# Patient Record
Sex: FEMALE | NOT HISPANIC OR LATINO | Employment: FULL TIME | URBAN - METROPOLITAN AREA
[De-identification: names, ages, dates, MRNs, and addresses within clinical notes are randomized per-mention and may not be internally consistent; named-entity substitution may affect disease eponyms.]

---

## 2023-04-21 LAB
ALANINE AMINOTRANSFERASE (SGPT) (U/L) IN SER/PLAS: 29 U/L (ref 7–45)
ALBUMIN (G/DL) IN SER/PLAS: 4.2 G/DL (ref 3.4–5)
ALBUMIN (MG/L) IN URINE: <7 MG/L
ALBUMIN/CREATININE (UG/MG) IN URINE: ABNORMAL UG/MG CRT (ref 0–30)
ALKALINE PHOSPHATASE (U/L) IN SER/PLAS: 125 U/L (ref 33–110)
ANION GAP IN SER/PLAS: 12 MMOL/L (ref 10–20)
ASPARTATE AMINOTRANSFERASE (SGOT) (U/L) IN SER/PLAS: 16 U/L (ref 9–39)
BILIRUBIN TOTAL (MG/DL) IN SER/PLAS: 0.6 MG/DL (ref 0–1.2)
CALCIUM (MG/DL) IN SER/PLAS: 9.1 MG/DL (ref 8.6–10.3)
CARBON DIOXIDE, TOTAL (MMOL/L) IN SER/PLAS: 23 MMOL/L (ref 21–32)
CHLORIDE (MMOL/L) IN SER/PLAS: 104 MMOL/L (ref 98–107)
CHOLESTEROL (MG/DL) IN SER/PLAS: 183 MG/DL (ref 0–199)
CHOLESTEROL IN HDL (MG/DL) IN SER/PLAS: 45.4 MG/DL
CHOLESTEROL/HDL RATIO: 4
CREATININE (MG/DL) IN SER/PLAS: 0.51 MG/DL (ref 0.5–1.05)
CREATININE (MG/DL) IN URINE: 8.8 MG/DL (ref 20–320)
ERYTHROCYTE DISTRIBUTION WIDTH (RATIO) BY AUTOMATED COUNT: 14.6 % (ref 11.5–14.5)
ERYTHROCYTE MEAN CORPUSCULAR HEMOGLOBIN CONCENTRATION (G/DL) BY AUTOMATED: 31.1 G/DL (ref 32–36)
ERYTHROCYTE MEAN CORPUSCULAR VOLUME (FL) BY AUTOMATED COUNT: 81 FL (ref 80–100)
ERYTHROCYTES (10*6/UL) IN BLOOD BY AUTOMATED COUNT: 4.78 X10E12/L (ref 4–5.2)
GFR FEMALE: >90 ML/MIN/1.73M2
GLUCOSE (MG/DL) IN SER/PLAS: 180 MG/DL (ref 74–99)
HEMATOCRIT (%) IN BLOOD BY AUTOMATED COUNT: 38.9 % (ref 36–46)
HEMOGLOBIN (G/DL) IN BLOOD: 12.1 G/DL (ref 12–16)
LDL: 112 MG/DL (ref 0–119)
LEUKOCYTES (10*3/UL) IN BLOOD BY AUTOMATED COUNT: 8.7 X10E9/L (ref 4.4–11.3)
NON HDL CHOLESTEROL: 138 MG/DL (ref 0–149)
PLATELETS (10*3/UL) IN BLOOD AUTOMATED COUNT: 405 X10E9/L (ref 150–450)
POTASSIUM (MMOL/L) IN SER/PLAS: 4.3 MMOL/L (ref 3.5–5.3)
PROTEIN TOTAL: 6.9 G/DL (ref 6.4–8.2)
SODIUM (MMOL/L) IN SER/PLAS: 135 MMOL/L (ref 136–145)
THYROTROPIN (MIU/L) IN SER/PLAS BY DETECTION LIMIT <= 0.05 MIU/L: 0.83 MIU/L (ref 0.44–3.98)
TRIGLYCERIDE (MG/DL) IN SER/PLAS: 128 MG/DL (ref 0–149)
UREA NITROGEN (MG/DL) IN SER/PLAS: 10 MG/DL (ref 6–23)
VLDL: 26 MG/DL (ref 0–40)

## 2024-02-20 ENCOUNTER — APPOINTMENT (OUTPATIENT)
Dept: ENDOCRINOLOGY | Facility: CLINIC | Age: 24
End: 2024-02-20
Payer: MEDICARE

## 2024-03-11 DIAGNOSIS — E10.9 TYPE 1 DIABETES MELLITUS WITHOUT COMPLICATION (MULTI): Primary | ICD-10-CM

## 2024-03-11 RX ORDER — BLOOD-GLUCOSE METER
EACH MISCELLANEOUS
Qty: 600 STRIP | Refills: 0 | Status: SHIPPED | OUTPATIENT
Start: 2024-03-11

## 2024-03-27 DIAGNOSIS — E10.9 TYPE 1 DIABETES MELLITUS WITHOUT COMPLICATION (MULTI): ICD-10-CM

## 2024-06-12 ENCOUNTER — APPOINTMENT (OUTPATIENT)
Dept: ENDOCRINOLOGY | Facility: CLINIC | Age: 24
End: 2024-06-12
Payer: MEDICARE

## 2024-06-12 VITALS
WEIGHT: 163 LBS | SYSTOLIC BLOOD PRESSURE: 115 MMHG | BODY MASS INDEX: 32.95 KG/M2 | HEART RATE: 89 BPM | DIASTOLIC BLOOD PRESSURE: 74 MMHG

## 2024-06-12 DIAGNOSIS — E10.9 TYPE 1 DIABETES MELLITUS WITHOUT COMPLICATION (MULTI): Primary | ICD-10-CM

## 2024-06-12 DIAGNOSIS — Z96.41 INSULIN PUMP STATUS: ICD-10-CM

## 2024-06-12 LAB — POC HEMOGLOBIN A1C: 7.8 % (ref 4.2–6.5)

## 2024-06-12 PROCEDURE — 99214 OFFICE O/P EST MOD 30 MIN: CPT | Performed by: INTERNAL MEDICINE

## 2024-06-12 PROCEDURE — 3078F DIAST BP <80 MM HG: CPT | Performed by: INTERNAL MEDICINE

## 2024-06-12 PROCEDURE — 3074F SYST BP LT 130 MM HG: CPT | Performed by: INTERNAL MEDICINE

## 2024-06-12 PROCEDURE — 83036 HEMOGLOBIN GLYCOSYLATED A1C: CPT | Performed by: INTERNAL MEDICINE

## 2024-06-12 RX ORDER — INSULIN LISPRO 100 [IU]/ML
INJECTION, SOLUTION INTRAVENOUS; SUBCUTANEOUS
COMMUNITY
Start: 2022-01-11

## 2024-06-12 RX ORDER — ALBUTEROL SULFATE 90 UG/1
AEROSOL, METERED RESPIRATORY (INHALATION)
COMMUNITY
Start: 2022-12-07

## 2024-06-12 RX ORDER — FLUTICASONE PROPIONATE 50 MCG
SPRAY, SUSPENSION (ML) NASAL
COMMUNITY
Start: 2022-02-09

## 2024-06-12 RX ORDER — URINE ACETONE TEST STRIPS
STRIP MISCELLANEOUS
COMMUNITY
Start: 2014-10-09

## 2024-06-12 RX ORDER — BLOOD-GLUCOSE METER
EACH MISCELLANEOUS
Qty: 800 STRIP | Refills: 3 | Status: SHIPPED | OUTPATIENT
Start: 2024-06-12

## 2024-06-12 RX ORDER — INSULIN GLARGINE 100 [IU]/ML
INJECTION, SOLUTION SUBCUTANEOUS
COMMUNITY
Start: 2019-01-02

## 2024-06-12 ASSESSMENT — ENCOUNTER SYMPTOMS
FREQUENCY: 0
COUGH: 0
APPETITE CHANGE: 0
HEADACHES: 0
NERVOUS/ANXIOUS: 0
CONSTIPATION: 0
VOMITING: 0
ARTHRALGIAS: 0
SORE THROAT: 0
ABDOMINAL PAIN: 0
SHORTNESS OF BREATH: 0
FEVER: 0
NAUSEA: 0
DIARRHEA: 0
POLYDIPSIA: 0

## 2024-06-12 NOTE — PROGRESS NOTES
History Of Present Illness  Hayley Clemons is a 23 y.o. female     Duration of type 1 diabetes mellitus:  12 years  Complications:  none    Patient last seen 14 months ago  Interval change from Medtronic to Tandem t:Slim    She decided she does not like DexCom CGM and stopped wearing it.  Adhesion is poor.     Insulin pump status  Insulin:  Humalog    Manual Basal  Basal total 24.2 units  MN-06 1  06-11 1.1 unit/h  11-17 0.9 unit/h  17-21 1.0 unit/h  21-MN 1.1 unit/h    Bolus  MN 1 unit per 12 grams carb    Insulin sensitivity factor:  50  Target glucose 110  Active insulin time 5h    Patient is testing glucose 7-8 times daily  Records reviewed, on file    Last eye exam:  2022    Past Medical History  She has a past medical history of Other specified health status and Other specified health status.    Surgical History  She has no past surgical history on file.     Social History  She reports that she has been smoking cigarettes. She has never used smokeless tobacco. No history on file for alcohol use and drug use.    Family History  No family history on file.    Medications  Current Outpatient Medications   Medication Instructions    acetone, urine, test (Ketostix) strip USE AS DIRECTED IF BLOOD GLUCOSE IS GREATER THAN 250 OR IF ILL    albuterol (ProAir HFA) 90 mcg/actuation inhaler 2 puff(s), Inhale, q6 hrs, # 8.5 g, 0 Refill(s), Type: Maintenance, Pharmacy: [id number], 2 puff(s) Inhale q6 hrs, 149, lb, 12/07/22 13:50:00 EST, Weight Measured    blood sugar diagnostic (OneTouch Verio test strips) strip test 6 times a day    fluticasone (Flonase Allergy Relief) 50 mcg/actuation nasal spray nasal    glucagon (Glucagen) 1 mg injection inject 1 mg as needed for severe hypoglycemia    insulin glargine (Lantus Solostar U-100 Insulin) 100 unit/mL (3 mL) pen subcutaneous    insulin lispro (HumaLOG) 100 unit/mL injection subcutaneous       Allergies  Bee pollen    Review of Systems   Constitutional:  Negative for  appetite change and fever.   HENT:  Negative for sore throat.         Denies dry mouth   Eyes:  Negative for visual disturbance.   Respiratory:  Negative for cough and shortness of breath.    Cardiovascular:  Negative for chest pain.   Gastrointestinal:  Negative for abdominal pain, constipation, diarrhea, nausea and vomiting.   Endocrine: Negative for polydipsia and polyuria.   Genitourinary:  Negative for frequency.   Musculoskeletal:  Negative for arthralgias.   Skin:  Negative for rash.   Neurological:  Negative for headaches.   Psychiatric/Behavioral:  The patient is not nervous/anxious.          Last Recorded Vitals  Blood pressure 115/74, pulse 89, weight 73.9 kg (163 lb).    Physical Exam  Constitutional:       General: She is not in acute distress.  HENT:      Head: Normocephalic.      Mouth/Throat:      Mouth: Mucous membranes are moist.   Eyes:      Extraocular Movements: Extraocular movements intact.   Neck:      Thyroid: No thyroid mass or thyromegaly.   Cardiovascular:      Pulses:           Radial pulses are 2+ on the right side and 2+ on the left side.   Musculoskeletal:      Right lower leg: No edema.      Left lower leg: No edema.      Right foot: No deformity.      Left foot: No deformity.   Lymphadenopathy:      Cervical: No cervical adenopathy.   Skin:     Comments: No foot sores   Neurological:      Mental Status: She is alert.      Motor: No tremor.   Psychiatric:         Mood and Affect: Affect normal.              IMPRESSION  TYPE 1 DIABETES MELLITUS  INSULIN PUMP STATUS  Rapid A1c 7.8%  Reasonable A1c   No glucose data provided  She has had trouble using CGM, DexCom or Rea  Unable to use automated mode (Control-IQ)  Problems with cost of Tandem supplies at [facility].      RECOMMENDATIONS  Continue current program    Tandem Pump supplies from appropriate supplier.    Follow up 3 months  Draw labs, urine albumin before next appointment

## 2024-06-12 NOTE — PATIENT INSTRUCTIONS
A1c 7.8%      RECOMMENDATIONS  Continue current program    Tandem Pump supplies from appropriate supplier.    Follow up 3 months  Draw labs, urine albumin before next appointment

## 2024-07-16 DIAGNOSIS — E10.9 TYPE 1 DIABETES MELLITUS WITHOUT COMPLICATION (MULTI): Primary | ICD-10-CM

## 2024-07-17 RX ORDER — INSULIN LISPRO 100 [IU]/ML
INJECTION, SOLUTION INTRAVENOUS; SUBCUTANEOUS
Qty: 90 ML | Refills: 3 | Status: SHIPPED | OUTPATIENT
Start: 2024-07-17

## 2024-09-18 ENCOUNTER — APPOINTMENT (OUTPATIENT)
Dept: ENDOCRINOLOGY | Facility: CLINIC | Age: 24
End: 2024-09-18
Payer: MEDICARE

## 2024-09-18 ENCOUNTER — LAB (OUTPATIENT)
Dept: LAB | Facility: LAB | Age: 24
End: 2024-09-18
Payer: MEDICARE

## 2024-09-18 VITALS
HEART RATE: 66 BPM | BODY MASS INDEX: 32.34 KG/M2 | SYSTOLIC BLOOD PRESSURE: 131 MMHG | DIASTOLIC BLOOD PRESSURE: 87 MMHG | WEIGHT: 160 LBS

## 2024-09-18 DIAGNOSIS — E10.9 TYPE 1 DIABETES MELLITUS WITHOUT COMPLICATION: ICD-10-CM

## 2024-09-18 DIAGNOSIS — Z96.41 INSULIN PUMP STATUS: ICD-10-CM

## 2024-09-18 DIAGNOSIS — E10.9 TYPE 1 DIABETES MELLITUS WITHOUT COMPLICATION: Primary | ICD-10-CM

## 2024-09-18 LAB
ALBUMIN SERPL BCP-MCNC: 4.2 G/DL (ref 3.4–5)
ALP SERPL-CCNC: 133 U/L (ref 33–110)
ALT SERPL W P-5'-P-CCNC: 12 U/L (ref 7–45)
ANION GAP SERPL CALC-SCNC: 12 MMOL/L (ref 10–20)
AST SERPL W P-5'-P-CCNC: 11 U/L (ref 9–39)
BILIRUB SERPL-MCNC: 0.5 MG/DL (ref 0–1.2)
BUN SERPL-MCNC: 7 MG/DL (ref 6–23)
CALCIUM SERPL-MCNC: 9.3 MG/DL (ref 8.6–10.3)
CHLORIDE SERPL-SCNC: 99 MMOL/L (ref 98–107)
CHOLEST SERPL-MCNC: 156 MG/DL (ref 0–199)
CHOLESTEROL/HDL RATIO: 3.7
CO2 SERPL-SCNC: 28 MMOL/L (ref 21–32)
CREAT SERPL-MCNC: 0.63 MG/DL (ref 0.5–1.05)
CREAT UR-MCNC: 96.8 MG/DL (ref 20–320)
EGFRCR SERPLBLD CKD-EPI 2021: >90 ML/MIN/1.73M*2
ERYTHROCYTE [DISTWIDTH] IN BLOOD BY AUTOMATED COUNT: 17.4 % (ref 11.5–14.5)
EST. AVERAGE GLUCOSE BLD GHB EST-MCNC: 192 MG/DL
GLUCOSE SERPL-MCNC: 181 MG/DL (ref 74–99)
HBA1C MFR BLD: 8.3 %
HCT VFR BLD AUTO: 39.9 % (ref 36–46)
HDLC SERPL-MCNC: 42.4 MG/DL
HGB BLD-MCNC: 11.6 G/DL (ref 12–16)
LDLC SERPL CALC-MCNC: 73 MG/DL
MCH RBC QN AUTO: 23.4 PG (ref 26–34)
MCHC RBC AUTO-ENTMCNC: 29.1 G/DL (ref 32–36)
MCV RBC AUTO: 80 FL (ref 80–100)
MICROALBUMIN UR-MCNC: 9.6 MG/L
MICROALBUMIN/CREAT UR: 9.9 UG/MG CREAT
NON HDL CHOLESTEROL: 114 MG/DL (ref 0–149)
NRBC BLD-RTO: 0.2 /100 WBCS (ref 0–0)
PLATELET # BLD AUTO: 428 X10*3/UL (ref 150–450)
POC HEMOGLOBIN A1C: 8 % (ref 4.2–6.5)
POTASSIUM SERPL-SCNC: 4.1 MMOL/L (ref 3.5–5.3)
PROT SERPL-MCNC: 6.8 G/DL (ref 6.4–8.2)
RBC # BLD AUTO: 4.96 X10*6/UL (ref 4–5.2)
SODIUM SERPL-SCNC: 135 MMOL/L (ref 136–145)
TRIGL SERPL-MCNC: 201 MG/DL (ref 0–149)
TSH SERPL-ACNC: 0.63 MIU/L (ref 0.44–3.98)
VLDL: 40 MG/DL (ref 0–40)
WBC # BLD AUTO: 8.7 X10*3/UL (ref 4.4–11.3)

## 2024-09-18 PROCEDURE — 80061 LIPID PANEL: CPT

## 2024-09-18 PROCEDURE — 82570 ASSAY OF URINE CREATININE: CPT

## 2024-09-18 PROCEDURE — 80053 COMPREHEN METABOLIC PANEL: CPT

## 2024-09-18 PROCEDURE — 36415 COLL VENOUS BLD VENIPUNCTURE: CPT

## 2024-09-18 PROCEDURE — 83036 HEMOGLOBIN GLYCOSYLATED A1C: CPT | Performed by: INTERNAL MEDICINE

## 2024-09-18 PROCEDURE — 99214 OFFICE O/P EST MOD 30 MIN: CPT | Performed by: INTERNAL MEDICINE

## 2024-09-18 PROCEDURE — 83036 HEMOGLOBIN GLYCOSYLATED A1C: CPT

## 2024-09-18 PROCEDURE — 85027 COMPLETE CBC AUTOMATED: CPT

## 2024-09-18 PROCEDURE — 82043 UR ALBUMIN QUANTITATIVE: CPT

## 2024-09-18 PROCEDURE — 84443 ASSAY THYROID STIM HORMONE: CPT

## 2024-09-18 ASSESSMENT — ENCOUNTER SYMPTOMS
POLYDIPSIA: 0
VOMITING: 0
FEVER: 0
SORE THROAT: 0
COUGH: 0
DIARRHEA: 0
SHORTNESS OF BREATH: 0
ABDOMINAL PAIN: 0
APPETITE CHANGE: 0
FREQUENCY: 0
HEADACHES: 0
ARTHRALGIAS: 0
CONSTIPATION: 0
NERVOUS/ANXIOUS: 0
NAUSEA: 0

## 2024-09-18 NOTE — PATIENT INSTRUCTIONS
RECOMMENDATIONS  Results available on [facility]  Call/message if you have not received results in 3 days.     Follow up 6 months.

## 2024-09-18 NOTE — PROGRESS NOTES
History Of Present Illness  Hayley Clemons is a 24 y.o. female     Duration of type 1 diabetes mellitus:  13 years  Complications:  none     She decided she does not like DexCom CGM and stopped wearing it.  Adhesion is poor.      Insulin pump status  Tandem t:Slim  Insulin:  Humalog     Manual Basal  Basal total 24.2 units  MN-06 1  06-11 1.1 unit/h  11-17 0.9 unit/h  17-21 1.0 unit/h  21-MN 1.1 unit/h     Bolus  MN 1 unit per 12 grams carb     Insulin sensitivity factor:  50  Target glucose 110  Active insulin time 5h     Patient is testing glucose 7-8 times daily  Records reviewed, on file     Last eye exam:  July 2024    Past Medical History  She has a past medical history of Other specified health status and Other specified health status.    Surgical History  She has no past surgical history on file.     Social History  She reports that she has been smoking cigarettes. She has never used smokeless tobacco. No history on file for alcohol use and drug use.    Family History  No family history on file.    Medications  Current Outpatient Medications   Medication Instructions    acetone, urine, test (Ketostix) strip USE AS DIRECTED IF BLOOD GLUCOSE IS GREATER THAN 250 OR IF ILL    albuterol (ProAir HFA) 90 mcg/actuation inhaler 2 puff(s), Inhale, q6 hrs, # 8.5 g, 0 Refill(s), Type: Maintenance, Pharmacy: [id number], 2 puff(s) Inhale q6 hrs, 149, lb, 12/07/22 13:50:00 EST, Weight Measured    blood sugar diagnostic (OneTouch Verio test strips) strip test 8 times a day    fluticasone (Flonase Allergy Relief) 50 mcg/actuation nasal spray nasal    glucagon (Glucagen) 1 mg injection inject 1 mg as needed for severe hypoglycemia    insulin glargine (Lantus Solostar U-100 Insulin) 100 unit/mL (3 mL) pen subcutaneous    insulin lispro (HumaLOG U-100 Insulin) 100 unit/mL injection CONTINUOUS INFUSION VIA SUBCUTANEOUS INSULIN PUMP.  TOTAL 90 UNITS PER DAY    insulin lispro (HumaLOG) 100 unit/mL injection subcutaneous        Allergies  Bee pollen    Review of Systems   Constitutional:  Negative for appetite change and fever.   HENT:  Negative for sore throat.         Denies dry mouth   Eyes:  Negative for visual disturbance.   Respiratory:  Negative for cough and shortness of breath.    Cardiovascular:  Negative for chest pain.   Gastrointestinal:  Negative for abdominal pain, constipation, diarrhea, nausea and vomiting.   Endocrine: Negative for polydipsia and polyuria.   Genitourinary:  Negative for frequency.   Musculoskeletal:  Negative for arthralgias.   Skin:  Negative for rash.   Neurological:  Negative for headaches.   Psychiatric/Behavioral:  The patient is not nervous/anxious.          Last Recorded Vitals  Blood pressure 131/87, pulse 66, weight 72.6 kg (160 lb).    Physical Exam  Constitutional:       General: She is not in acute distress.  HENT:      Head: Normocephalic.      Mouth/Throat:      Mouth: Mucous membranes are moist.   Eyes:      Extraocular Movements: Extraocular movements intact.   Neck:      Thyroid: No thyroid mass or thyromegaly.   Cardiovascular:      Pulses:           Radial pulses are 2+ on the right side and 2+ on the left side.   Musculoskeletal:      Right lower leg: No edema.      Left lower leg: No edema.   Lymphadenopathy:      Cervical: No cervical adenopathy.   Neurological:      Mental Status: She is alert.      Motor: No tremor.   Psychiatric:         Mood and Affect: Affect normal.          Relevant Results  Glucose   Date Value   04/21/2023 180 mg/dL (H)   04/10/2021 218 MG/DL (H)     POC HEMOGLOBIN A1c (%)   Date Value   09/18/2024 8.0 (A)   06/12/2024 7.8 (A)     Bicarbonate   Date Value   04/21/2023 23 mmol/L   04/10/2021 23 MMOL/L (L)     Urea Nitrogen   Date Value   04/21/2023 10 mg/dL   04/10/2021 10 MG/DL     Creatinine   Date Value   04/21/2023 0.51 mg/dL   04/10/2021 0.5 MG/DL     Lab Results   Component Value Date    CHOL 183 04/21/2023    CHOL 172 02/08/2019     Lab Results    Component Value Date    HDL 45.4 04/21/2023    HDL 33.5 (A) 02/08/2019       Lab Results   Component Value Date    TRIG 128 04/21/2023    TRIG 133 02/08/2019     Lab Results   Component Value Date    TSH 0.83 04/21/2023         IMPRESSION  TYPE 1 DIABETES MELLITUS  INSULIN PUMP STATUS  Not wearing CGM, unable to benefit from automated mode  No current labs      RECOMMENDATIONS  Results available on [facility]  Call/message if you have not received results in 3 days.     Follow up 6 months.

## 2024-10-25 ENCOUNTER — TELEPHONE (OUTPATIENT)
Dept: ENDOCRINOLOGY | Facility: CLINIC | Age: 24
End: 2024-10-25
Payer: MEDICARE

## 2024-10-25 NOTE — TELEPHONE ENCOUNTER
Pt pharmacy called and inquired about a replacement for Humalog as it had been rejected. After talking to the pharmacist, they discovered that they just had to swap it to generic and the Rx went through. Pharmacist apologized for the inconvenience and said the Rx would be ready for the Pt in the next hour or two.

## 2024-11-01 ENCOUNTER — ANCILLARY PROCEDURE (OUTPATIENT)
Dept: URGENT CARE | Age: 24
End: 2024-11-01
Payer: MEDICARE

## 2024-11-01 ENCOUNTER — OFFICE VISIT (OUTPATIENT)
Dept: URGENT CARE | Age: 24
End: 2024-11-01
Payer: MEDICARE

## 2024-11-01 VITALS
OXYGEN SATURATION: 100 % | TEMPERATURE: 98.2 F | HEART RATE: 96 BPM | DIASTOLIC BLOOD PRESSURE: 75 MMHG | SYSTOLIC BLOOD PRESSURE: 123 MMHG | WEIGHT: 161.82 LBS | BODY MASS INDEX: 32.71 KG/M2 | RESPIRATION RATE: 24 BRPM

## 2024-11-01 DIAGNOSIS — J20.9 ACUTE BRONCHITIS, UNSPECIFIED ORGANISM: Primary | ICD-10-CM

## 2024-11-01 DIAGNOSIS — R05.9 COUGH, UNSPECIFIED TYPE: ICD-10-CM

## 2024-11-01 PROCEDURE — 71046 X-RAY EXAM CHEST 2 VIEWS: CPT

## 2024-11-01 RX ORDER — DOXYCYCLINE 100 MG/1
100 CAPSULE ORAL 2 TIMES DAILY
Qty: 14 CAPSULE | Refills: 0 | Status: SHIPPED | OUTPATIENT
Start: 2024-11-01 | End: 2024-11-08

## 2024-11-01 RX ORDER — FLUTICASONE PROPIONATE 110 UG/1
1 AEROSOL, METERED RESPIRATORY (INHALATION) DAILY
Qty: 12 G | Refills: 0 | Status: SHIPPED | OUTPATIENT
Start: 2024-11-01 | End: 2024-11-11

## 2024-11-01 ASSESSMENT — ENCOUNTER SYMPTOMS
SINUS PRESSURE: 0
CHEST TIGHTNESS: 0
FATIGUE: 0
FEVER: 0
COUGH: 1
CHILLS: 0
ABDOMINAL PAIN: 0
SINUS COMPLAINT: 1
DIARRHEA: 0
VOMITING: 0
WHEEZING: 1
SORE THROAT: 0
DIZZINESS: 0
STRIDOR: 0
SHORTNESS OF BREATH: 0

## 2024-11-01 ASSESSMENT — VISUAL ACUITY: OU: 1

## 2025-01-26 ENCOUNTER — OFFICE VISIT (OUTPATIENT)
Dept: URGENT CARE | Age: 25
End: 2025-01-26
Payer: MEDICARE

## 2025-01-26 VITALS
DIASTOLIC BLOOD PRESSURE: 95 MMHG | HEART RATE: 82 BPM | SYSTOLIC BLOOD PRESSURE: 153 MMHG | TEMPERATURE: 98.2 F | OXYGEN SATURATION: 98 % | WEIGHT: 166.01 LBS | BODY MASS INDEX: 33.56 KG/M2

## 2025-01-26 DIAGNOSIS — R06.2 WHEEZING: ICD-10-CM

## 2025-01-26 DIAGNOSIS — J01.40 ACUTE NON-RECURRENT PANSINUSITIS: Primary | ICD-10-CM

## 2025-01-26 RX ORDER — ALBUTEROL SULFATE 90 UG/1
2 INHALANT RESPIRATORY (INHALATION) EVERY 4 HOURS PRN
Qty: 8.5 G | Refills: 0 | Status: SHIPPED | OUTPATIENT
Start: 2025-01-26 | End: 2026-01-26

## 2025-01-26 RX ORDER — AMOXICILLIN AND CLAVULANATE POTASSIUM 875; 125 MG/1; MG/1
1 TABLET, FILM COATED ORAL 2 TIMES DAILY
Qty: 20 TABLET | Refills: 0 | Status: SHIPPED | OUTPATIENT
Start: 2025-01-26 | End: 2025-02-05

## 2025-01-26 NOTE — PROGRESS NOTES
Subjective   Patient ID: Hayley Clemons is a 24 y.o. female. They present today with a chief complaint of Sinus Problem (Thick mucus pressure 1 week ), Headache (Off and on 1 week ), Chills (1 week ), Chest Congestion (1 week), and Earache (1 week ).    History of Present Illness  24-year-old female presents urgent care for complaint of concern for sinus infection.  States she does have frontal maxillary sinus tenderness and sinus congestion with postnasal drip and rhinorrhea for past 1 week.  States intermittent headache and chills and now has wheezing and mild intermittent earache.  States she has history of sinusitis and bronchitis.  Denies any current fevers, nausea, vomiting, sweats, chest pain, shortness of breath, abdominal pain.  Discussed chest x-ray with patient and patient declines chest x-ray at this time.  Prescribed Augmentin and albuterol inhaler.  Placed PCP referral for patient to follow-up with next available appointment for establishing care and to recheck blood pressure.  Return/ER precautions.  Educated on supportive care.  Patient agrees with plan.          Past Medical History  Allergies as of 01/26/2025    (No Known Allergies)       (Not in a hospital admission)       Past Medical History:   Diagnosis Date    Other specified health status     No pertinent past medical history    Other specified health status     No pertinent past surgical history       No past surgical history on file.     reports that she has been smoking cigarettes. She has never used smokeless tobacco.    Review of Systems  Review of Systems   All other systems reviewed and are negative.                                 Objective    Vitals:    01/26/25 1555   BP: (!) 153/95   BP Location: Right arm   Patient Position: Sitting   Pulse: 82   Temp: 36.8 °C (98.2 °F)   TempSrc: Oral   SpO2: 98%   Weight: 75.3 kg (166 lb 0.1 oz)     Patient's last menstrual period was 12/28/2024 (exact date).    Physical Exam  Vitals reviewed.    Constitutional:       General: She is not in acute distress.     Appearance: Normal appearance. She is not ill-appearing, toxic-appearing or diaphoretic.   HENT:      Head: Normocephalic and atraumatic.      Comments: Frontal and maxillary sinus tenderness.     Right Ear: Tympanic membrane, ear canal and external ear normal.      Left Ear: Tympanic membrane, ear canal and external ear normal.      Nose: Congestion and rhinorrhea present.      Mouth/Throat:      Mouth: Mucous membranes are moist.      Pharynx: Oropharynx is clear.   Cardiovascular:      Rate and Rhythm: Normal rate and regular rhythm.   Pulmonary:      Effort: Pulmonary effort is normal. No respiratory distress.      Breath sounds: No stridor. Wheezing (Mild wheeze bilateral upper and lower lobes.) present. No rhonchi or rales.   Abdominal:      General: Abdomen is flat.      Palpations: Abdomen is soft.      Tenderness: There is no abdominal tenderness. There is no right CVA tenderness or left CVA tenderness.   Musculoskeletal:      Cervical back: Normal range of motion and neck supple. No rigidity or tenderness.   Lymphadenopathy:      Cervical: No cervical adenopathy.   Skin:     General: Skin is warm and dry.   Neurological:      General: No focal deficit present.      Mental Status: She is alert and oriented to person, place, and time.   Psychiatric:         Mood and Affect: Mood normal.         Behavior: Behavior normal.         Procedures    Point of Care Test & Imaging Results from this visit  No results found for this visit on 01/26/25.   No results found.    Diagnostic study results (if any) were reviewed by Barbra Liu PA-C.    Assessment/Plan   Allergies, medications, history, and pertinent labs/EKGs/Imaging reviewed by Barbra Liu PA-C.     Medical Decision Making  24-year-old female presents urgent care for complaint of concern for sinus infection.  States she does have frontal maxillary sinus tenderness and sinus  congestion with postnasal drip and rhinorrhea for past 1 week.  States intermittent headache and chills and now has wheezing and mild intermittent earache.  States she has history of sinusitis and bronchitis.  Denies any current fevers, nausea, vomiting, sweats, chest pain, shortness of breath, abdominal pain.  Discussed chest x-ray with patient and patient declines chest x-ray at this time.  Prescribed Augmentin and albuterol inhaler.  Placed PCP referral for patient to follow-up with next available appointment for establishing care and to recheck blood pressure.  Return/ER precautions.  Educated on supportive care.  Patient agrees with plan.    Orders and Diagnoses  There are no diagnoses linked to this encounter.    Medical Admin Record      Patient disposition: Home    Electronically signed by Barbra Liu PA-C  4:02 PM

## 2025-01-26 NOTE — PATIENT INSTRUCTIONS
Take medications as prescribed.  Maintain adequate hydration and nutrition.  If symptoms worsen, do not improve, or any other concerning or worrisome symptoms develop please return to the clinic or go to the emergency department.  Call referral number tomorrow morning to schedule next available appointment with PCP to become established.

## 2025-01-28 ENCOUNTER — OFFICE VISIT (OUTPATIENT)
Dept: URGENT CARE | Age: 25
End: 2025-01-28
Payer: MEDICARE

## 2025-01-28 VITALS
RESPIRATION RATE: 16 BRPM | OXYGEN SATURATION: 97 % | DIASTOLIC BLOOD PRESSURE: 89 MMHG | SYSTOLIC BLOOD PRESSURE: 139 MMHG | TEMPERATURE: 98.4 F | WEIGHT: 166 LBS | HEART RATE: 101 BPM | HEIGHT: 60 IN | BODY MASS INDEX: 32.59 KG/M2

## 2025-01-28 DIAGNOSIS — R06.2 WHEEZING: ICD-10-CM

## 2025-01-28 DIAGNOSIS — R05.9 COUGH, UNSPECIFIED TYPE: ICD-10-CM

## 2025-01-28 DIAGNOSIS — J40 BRONCHITIS: Primary | ICD-10-CM

## 2025-01-28 RX ORDER — LEVOFLOXACIN 750 MG/1
750 TABLET ORAL DAILY
Qty: 7 TABLET | Refills: 0 | Status: SHIPPED | OUTPATIENT
Start: 2025-01-28 | End: 2025-02-04

## 2025-01-28 RX ORDER — METHYLPREDNISOLONE 4 MG/1
TABLET ORAL
Qty: 21 TABLET | Refills: 0 | Status: SHIPPED | OUTPATIENT
Start: 2025-01-28 | End: 2025-02-03

## 2025-01-28 RX ORDER — BENZONATATE 100 MG/1
100 CAPSULE ORAL 3 TIMES DAILY PRN
Qty: 20 CAPSULE | Refills: 0 | Status: SHIPPED | OUTPATIENT
Start: 2025-01-28 | End: 2025-02-04

## 2025-01-28 ASSESSMENT — ENCOUNTER SYMPTOMS
GASTROINTESTINAL NEGATIVE: 1
WHEEZING: 1
SINUS PRESSURE: 1
ALLERGIC/IMMUNOLOGIC NEGATIVE: 1
COUGH: 1
RHINORRHEA: 1
ENDOCRINE NEGATIVE: 1
MUSCULOSKELETAL NEGATIVE: 1
PSYCHIATRIC NEGATIVE: 1
HEMATOLOGIC/LYMPHATIC NEGATIVE: 1
HEADACHES: 1
FEVER: 1
EYES NEGATIVE: 1
FATIGUE: 1
SINUS PAIN: 1

## 2025-01-28 NOTE — PROGRESS NOTES
Subjective   Patient ID: Hayley Clemons is a 24 y.o. female. They present today with a chief complaint of Other (Seen 2 days ago, given Augmentin feels like not working. Wheezing, coughing).    History of Present Illness    History provided by:  Patient   used: No    Cough  This is a recurrent problem. The current episode started 1 to 4 weeks ago. The problem has been gradually worsening. The problem occurs every few minutes. The cough is Productive of brown sputum. Associated symptoms include ear pain, a fever, headaches, nasal congestion, postnasal drip, rhinorrhea and wheezing. The symptoms are aggravated by lying down. She has tried OTC cough suppressant for the symptoms.       Past Medical History  Allergies as of 01/28/2025    (No Known Allergies)       (Not in a hospital admission)       Past Medical History:   Diagnosis Date    Other specified health status     No pertinent past medical history    Other specified health status     No pertinent past surgical history       No past surgical history on file.     reports that she has been smoking cigarettes. She has never used smokeless tobacco.    Review of Systems  Review of Systems   Constitutional:  Positive for fatigue and fever.   HENT:  Positive for ear pain, postnasal drip, rhinorrhea, sinus pressure and sinus pain.    Eyes: Negative.    Respiratory:  Positive for cough and wheezing.    Gastrointestinal: Negative.    Endocrine: Negative.    Genitourinary: Negative.    Musculoskeletal: Negative.    Skin: Negative.    Allergic/Immunologic: Negative.    Neurological:  Positive for headaches.   Hematological: Negative.    Psychiatric/Behavioral: Negative.                                    Objective    Vitals:    01/28/25 1341   BP: 139/89   BP Location: Left arm   Patient Position: Sitting   BP Cuff Size: Adult   Pulse: 101   Resp: 16   Temp: 36.9 °C (98.4 °F)   TempSrc: Oral   SpO2: 97%   Weight: 75.3 kg (166 lb)   Height: 1.511 m (4'  "11.5\")     Patient's last menstrual period was 01/27/2025 (exact date).    Physical Exam  Vitals reviewed.   Constitutional:       Appearance: Normal appearance. She is normal weight.   HENT:      Right Ear: Tympanic membrane is erythematous and bulging.      Left Ear: Tympanic membrane is erythematous and bulging.      Nose: Nasal tenderness, mucosal edema, congestion and rhinorrhea present. Rhinorrhea is purulent.      Right Sinus: Maxillary sinus tenderness present.      Left Sinus: Maxillary sinus tenderness present.   Cardiovascular:      Rate and Rhythm: Normal rate and regular rhythm.   Pulmonary:      Effort: Pulmonary effort is normal.      Breath sounds: Examination of the right-lower field reveals wheezing. Examination of the left-lower field reveals wheezing. Wheezing present.   Abdominal:      General: Bowel sounds are normal.      Palpations: Abdomen is soft.   Musculoskeletal:         General: Normal range of motion.   Skin:     General: Skin is warm and dry.   Neurological:      General: No focal deficit present.      Mental Status: She is alert and oriented to person, place, and time. Mental status is at baseline.   Psychiatric:         Mood and Affect: Mood normal.         Thought Content: Thought content normal.         Judgment: Judgment normal.         Procedures    Point of Care Test & Imaging Results from this visit  No results found for this visit on 01/28/25.   No results found.    Diagnostic study results (if any) were reviewed by PALOMA Montenegro.    Assessment/Plan   Allergies, medications, history, and pertinent labs/EKGs/Imaging reviewed by PALOMA Montenegro.     Medical Decision Making  Medical Decision Making  At time of discharge patient was clinically well-appearing and HDS for outpatient management. The patient and/or family was educated regarding diagnosis, supportive care, OTC and Rx medications. The patient and/or family was given the opportunity to ask " questions prior to discharge.  They verbalized understanding of my discussion of the plans for treatment, expected course, indications to return to UC or seek further evaluation in ED, and the need for timely follow up as directed.   They were provided with a work/school excuse if requested.    Rx Augmentin 1/26/2025 symptoms continued to worsen.  Change to levaquin.     Diagnoses and all orders for this visit:  Bronchitis  -     levoFLOXacin (Levaquin) 750 mg tablet; Take 1 tablet (750 mg) by mouth once daily for 7 days.  -     methylPREDNISolone (Medrol Dospak) 4 mg tablets; Follow schedule on package instructions  -     benzonatate (Tessalon) 100 mg capsule; Take 1 capsule (100 mg) by mouth 3 times a day as needed for cough for up to 7 days. Do not crush or chew.  Wheezing  -     levoFLOXacin (Levaquin) 750 mg tablet; Take 1 tablet (750 mg) by mouth once daily for 7 days.  -     methylPREDNISolone (Medrol Dospak) 4 mg tablets; Follow schedule on package instructions  -     benzonatate (Tessalon) 100 mg capsule; Take 1 capsule (100 mg) by mouth 3 times a day as needed for cough for up to 7 days. Do not crush or chew.  Cough, unspecified type  -     levoFLOXacin (Levaquin) 750 mg tablet; Take 1 tablet (750 mg) by mouth once daily for 7 days.  -     methylPREDNISolone (Medrol Dospak) 4 mg tablets; Follow schedule on package instructions  -     benzonatate (Tessalon) 100 mg capsule; Take 1 capsule (100 mg) by mouth 3 times a day as needed for cough for up to 7 days. Do not crush or chew.      Return to Urgent care if symptoms return or progress  Follow up with PCP in 1-2 weeks     Patient disposition: Home    Electronically signed by BETHANY Montenegro-MARGAUX  2:02 PM

## 2025-01-28 NOTE — LETTER
January 28, 2025     Patient: Hayley Clemons   YOB: 2000   Date of Visit: 1/28/2025       To Whom It May Concern:    Hayley Clemons was seen in my clinic on 1/28/2025 at 2:00 pm. Please excuse Hayley for her absence from work on this day to make the appointment. Please excuse her for 1/26/2025 and 1/27/2025. She can return 01/29/2025.    If you have any questions or concerns, please don't hesitate to call.         Sincerely,       [facility]

## 2025-03-18 ENCOUNTER — APPOINTMENT (OUTPATIENT)
Dept: ENDOCRINOLOGY | Facility: CLINIC | Age: 25
End: 2025-03-18
Payer: MEDICARE

## 2025-03-18 VITALS
BODY MASS INDEX: 31.85 KG/M2 | DIASTOLIC BLOOD PRESSURE: 85 MMHG | HEART RATE: 102 BPM | WEIGHT: 160.4 LBS | SYSTOLIC BLOOD PRESSURE: 130 MMHG

## 2025-03-18 DIAGNOSIS — Z96.41 INSULIN PUMP STATUS: ICD-10-CM

## 2025-03-18 DIAGNOSIS — E10.65 TYPE 1 DIABETES MELLITUS WITH HYPERGLYCEMIA (MULTI): Primary | ICD-10-CM

## 2025-03-18 LAB — POC HEMOGLOBIN A1C: 9.7 % (ref 4.2–6.5)

## 2025-03-18 PROCEDURE — 3079F DIAST BP 80-89 MM HG: CPT | Performed by: INTERNAL MEDICINE

## 2025-03-18 PROCEDURE — 3075F SYST BP GE 130 - 139MM HG: CPT | Performed by: INTERNAL MEDICINE

## 2025-03-18 PROCEDURE — 83036 HEMOGLOBIN GLYCOSYLATED A1C: CPT | Performed by: INTERNAL MEDICINE

## 2025-03-18 PROCEDURE — 99214 OFFICE O/P EST MOD 30 MIN: CPT | Performed by: INTERNAL MEDICINE

## 2025-03-18 RX ORDER — BLOOD-GLUCOSE METER
EACH MISCELLANEOUS
Qty: 800 STRIP | Refills: 3 | Status: SHIPPED | OUTPATIENT
Start: 2025-03-18

## 2025-03-18 RX ORDER — INSULIN LISPRO 100 [IU]/ML
INJECTION, SOLUTION INTRAVENOUS; SUBCUTANEOUS
Qty: 90 ML | Refills: 3 | Status: SHIPPED | OUTPATIENT
Start: 2025-03-18

## 2025-03-18 RX ORDER — URINE ACETONE TEST STRIPS
STRIP MISCELLANEOUS
Qty: 25 EACH | Refills: 3 | Status: SHIPPED | OUTPATIENT
Start: 2025-03-18

## 2025-03-18 ASSESSMENT — ENCOUNTER SYMPTOMS
SHORTNESS OF BREATH: 0
VOMITING: 0
DIARRHEA: 0
NERVOUS/ANXIOUS: 0
ARTHRALGIAS: 0
HEADACHES: 0
SORE THROAT: 0
COUGH: 0
FEVER: 0
ABDOMINAL PAIN: 0
FREQUENCY: 0
CONSTIPATION: 0
POLYDIPSIA: 0
NAUSEA: 0
APPETITE CHANGE: 0

## 2025-03-18 NOTE — PROGRESS NOTES
History Of Present Illness  Hayley Clemons is a 24 y.o. female     Duration of type 1 diabetes mellitus:  13 years  Complications:  none     Multiple respiratory infections over the winter.      Insulin pump status  Tandem t:Slim  Insulin:  Humalog     Manual Basal  Basal total 24.2 units  MN-06 1  06-11 1.1 unit/h  11-17 0.9 unit/h  17-21 1.0 unit/h  21-MN 1.1 unit/h     Bolus  MN 1 unit per 12 grams carb     Insulin sensitivity factor:  50  Target glucose 110  Active insulin time 5h     Patient is testing glucose 4-8 times daily  Records reviewed, on file     Last eye exam:  July 2024    Past Medical History  She has a past medical history of Other specified health status and Other specified health status.    Surgical History  She has no past surgical history on file.     Social History  She reports that she has been smoking cigarettes. She has never used smokeless tobacco. No history on file for alcohol use and drug use.    Family History  No family history on file.    Medications  Current Outpatient Medications   Medication Instructions    acetone, urine, test (Ketostix) strip USE AS DIRECTED IF BLOOD GLUCOSE IS GREATER THAN 250 OR IF ILL    albuterol (ProAir HFA) 90 mcg/actuation inhaler 2 puff(s), Inhale, q6 hrs, # 8.5 g, 0 Refill(s), Type: Maintenance, Pharmacy: [id number], 2 puff(s) Inhale q6 hrs, 149, lb, 12/07/22 13:50:00 EST, Weight Measured    albuterol (ProAir HFA) 90 mcg/actuation inhaler 2 puffs, inhalation, Every 4 hours PRN    blood sugar diagnostic (OneTouch Verio test strips) strip test 8 times a day    fluticasone (Flovent HFA) 110 mcg/actuation inhaler 1 puff, inhalation, Daily, Rinse mouth with water after use to reduce aftertaste and incidence of candidiasis. Do not swallow.    glucagon (Glucagen) 1 mg injection     insulin glargine (Lantus Solostar U-100 Insulin) 100 unit/mL (3 mL) pen Inject under the skin.    insulin lispro (HumaLOG U-100 Insulin) 100 unit/mL injection CONTINUOUS  INFUSION VIA SUBCUTANEOUS INSULIN PUMP.  TOTAL 90 UNITS PER DAY    insulin lispro (HumaLOG) 100 unit/mL injection Inject under the skin.       Allergies  Patient has no known allergies.    Review of Systems   Constitutional:  Negative for appetite change and fever.   HENT:  Negative for sore throat.         Denies dry mouth   Eyes:  Negative for visual disturbance.   Respiratory:  Negative for cough and shortness of breath.    Cardiovascular:  Negative for chest pain.   Gastrointestinal:  Negative for abdominal pain, constipation, diarrhea, nausea and vomiting.   Endocrine: Negative for polydipsia and polyuria.   Genitourinary:  Negative for frequency.   Musculoskeletal:  Negative for arthralgias.   Skin:  Negative for rash.   Neurological:  Negative for headaches.   Psychiatric/Behavioral:  The patient is not nervous/anxious.          Last Recorded Vitals  Blood pressure 130/85, pulse 102, weight 72.8 kg (160 lb 6.4 oz).    Physical Exam  Constitutional:       General: She is not in acute distress.  HENT:      Head: Normocephalic.      Mouth/Throat:      Mouth: Mucous membranes are moist.   Eyes:      Extraocular Movements: Extraocular movements intact.   Neck:      Thyroid: No thyroid mass or thyromegaly.   Cardiovascular:      Pulses:           Radial pulses are 2+ on the right side and 2+ on the left side.   Musculoskeletal:      Right lower leg: No edema.      Left lower leg: No edema.   Lymphadenopathy:      Cervical: No cervical adenopathy.   Neurological:      Mental Status: She is alert.      Motor: Tremor present.   Psychiatric:         Mood and Affect: Affect normal.          Relevant Results  Glucose   Date Value   09/18/2024 181 mg/dL (H)   04/21/2023 180 mg/dL (H)   04/10/2021 218 MG/DL (H)     POC HEMOGLOBIN A1c (%)   Date Value   03/18/2025 9.7 (A)   09/18/2024 8.0 (A)   06/12/2024 7.8 (A)     Hemoglobin A1C (%)   Date Value   09/18/2024 8.3 (H)     Bicarbonate   Date Value   09/18/2024 28 mmol/L    04/21/2023 23 mmol/L   04/10/2021 23 MMOL/L (L)     Urea Nitrogen   Date Value   09/18/2024 7 mg/dL   04/21/2023 10 mg/dL   04/10/2021 10 MG/DL     Creatinine   Date Value   09/18/2024 0.63 mg/dL   04/21/2023 0.51 mg/dL   04/10/2021 0.5 MG/DL     Lab Results   Component Value Date    CHOL 156 09/18/2024    CHOL 183 04/21/2023    CHOL 172 02/08/2019     Lab Results   Component Value Date    HDL 42.4 09/18/2024    HDL 45.4 04/21/2023    HDL 33.5 (A) 02/08/2019     Lab Results   Component Value Date    LDLCALC 73 09/18/2024     Lab Results   Component Value Date    TRIG 201 (H) 09/18/2024    TRIG 128 04/21/2023    TRIG 133 02/08/2019     Lab Results   Component Value Date    TSH 0.63 09/18/2024         IMPRESSION  TYPE 1 DIABETES MELLITUS WITH HYPERGLYCEMIA  INSULIN PUMP STATUS  Rapid A1c 9.7%  A1c rising  Postprandial hyperglycemia      RECOMMENDATIONS  Manual Basal  Basal total 28.1 units  MN-06 1.1  06-11 1.3 unit/h  11-17 1.1 unit/h  17-21 1.2 unit/h  21-MN 1.2 unit/h     Bolus  MN 1 unit per 10 grams carb    Follow up 3 months

## 2025-03-18 NOTE — PATIENT INSTRUCTIONS
RECOMMENDATIONS  Manual Basal  Basal total 28.1 units  MN-06 1.1  06-11 1.3 unit/h  11-17 1.1 unit/h  17-21 1.2 unit/h  21-MN 1.2 unit/h     Bolus  MN 1 unit per 10 grams carb    Follow up 3 months

## 2025-06-24 ENCOUNTER — APPOINTMENT (OUTPATIENT)
Dept: ENDOCRINOLOGY | Facility: CLINIC | Age: 25
End: 2025-06-24
Payer: MEDICARE

## 2025-06-24 VITALS
HEART RATE: 76 BPM | BODY MASS INDEX: 32.57 KG/M2 | SYSTOLIC BLOOD PRESSURE: 131 MMHG | WEIGHT: 164 LBS | DIASTOLIC BLOOD PRESSURE: 89 MMHG

## 2025-06-24 DIAGNOSIS — E10.65 TYPE 1 DIABETES MELLITUS WITH HYPERGLYCEMIA (MULTI): Primary | ICD-10-CM

## 2025-06-24 DIAGNOSIS — Z96.41 INSULIN PUMP STATUS: ICD-10-CM

## 2025-06-24 LAB — POC HEMOGLOBIN A1C: 8.7 % (ref 4.2–6.5)

## 2025-06-24 PROCEDURE — 3052F HG A1C>EQUAL 8.0%<EQUAL 9.0%: CPT | Performed by: INTERNAL MEDICINE

## 2025-06-24 PROCEDURE — 3079F DIAST BP 80-89 MM HG: CPT | Performed by: INTERNAL MEDICINE

## 2025-06-24 PROCEDURE — 99214 OFFICE O/P EST MOD 30 MIN: CPT | Performed by: INTERNAL MEDICINE

## 2025-06-24 PROCEDURE — 3075F SYST BP GE 130 - 139MM HG: CPT | Performed by: INTERNAL MEDICINE

## 2025-06-24 PROCEDURE — 83036 HEMOGLOBIN GLYCOSYLATED A1C: CPT | Performed by: INTERNAL MEDICINE

## 2025-06-24 NOTE — PATIENT INSTRUCTIONS
A1c 8.7%    RECOMMENDATIONS  Basal total 28.6 units  MN-06 1.1  06-11 1.4 unit/h  11-17 1.1 unit/h  17-21 1.2 unit/h  21-MN 1.2 unit/h    Pursue replacement of pump from [employer]    Follow up 3 months  Draw labs, urine albumin before next appointment

## 2025-06-24 NOTE — PROGRESS NOTES
History Of Present Illness  Hayley Clemons is a 24 y.o. female     Duration of type 1 diabetes mellitus:  13 years  Complications:  none      Insulin pump status  Tandem t:Slim  Insulin:  Humalog     Manual Basal  Basal total 28.1 units  MN-06 1.1  06-11 1.3 unit/h  11-17 1.1 unit/h  17-21 1.2 unit/h  21-MN 1.2 unit/h     Bolus  MN 1 unit per 10 grams carb     Insulin sensitivity factor:  50  Target glucose 110  Active insulin time 5h     Patient is testing glucose 4-8 times daily  Records reviewed, on file  Unable to use DexCom     Last eye exam:  July 2024    Past Medical History  She has a past medical history of Other specified health status and Other specified health status.    Surgical History  She has no past surgical history on file.     Social History  She reports that she has been smoking cigarettes. She has never used smokeless tobacco. No history on file for alcohol use and drug use.    Family History  Family History[1]    Medications  Current Outpatient Medications   Medication Instructions    albuterol (ProAir HFA) 90 mcg/actuation inhaler 2 puff(s), Inhale, q6 hrs, # 8.5 g, 0 Refill(s), Type: Maintenance, Pharmacy: [id number], 2 puff(s) Inhale q6 hrs, 149, lb, 12/07/22 13:50:00 EST, Weight Measured    albuterol (ProAir HFA) 90 mcg/actuation inhaler 2 puffs, inhalation, Every 4 hours PRN    glucagon (GLUCAGEN) 1 mg, intramuscular, As needed    insulin glargine (Lantus Solostar U-100 Insulin) 100 unit/mL (3 mL) pen Inject under the skin.    insulin lispro (HumaLOG U-100 Insulin) 100 unit/mL injection CONTINUOUS INFUSION VIA SUBCUTANEOUS INSULIN PUMP.  TOTAL 90 UNITS PER DAY    Ketostix strip Test urine ketones if glucose is over 250 mg/dl.    OneTouch Verio test strips strip test 8 times a day       Allergies  Patient has no known allergies.    Review of Systems   Constitutional:  Negative for appetite change and fever.   HENT:  Negative for sore throat.         Denies dry mouth   Eyes:   Negative for visual disturbance.   Respiratory:  Negative for cough and shortness of breath.    Cardiovascular:  Negative for chest pain.   Gastrointestinal:  Negative for abdominal pain, constipation, diarrhea, nausea and vomiting.   Endocrine: Negative for polydipsia and polyuria.   Genitourinary:  Negative for frequency.   Musculoskeletal:  Negative for arthralgias.   Skin:  Negative for rash.   Neurological:  Negative for headaches.   Psychiatric/Behavioral:  The patient is not nervous/anxious.          Last Recorded Vitals  Blood pressure 131/89, pulse 76, weight 74.4 kg (164 lb).    Physical Exam  Constitutional:       General: She is not in acute distress.  HENT:      Head: Normocephalic.      Mouth/Throat:      Mouth: Mucous membranes are moist.   Eyes:      Extraocular Movements: Extraocular movements intact.   Neck:      Thyroid: No thyroid mass or thyromegaly.   Cardiovascular:      Pulses:           Radial pulses are 2+ on the right side and 2+ on the left side.   Musculoskeletal:      Right lower leg: No edema.      Left lower leg: No edema.   Lymphadenopathy:      Cervical: No cervical adenopathy.   Neurological:      Mental Status: She is alert.   Psychiatric:         Mood and Affect: Affect normal.          Relevant Results  Glucose   Date Value   09/18/2024 181 mg/dL (H)   04/21/2023 180 mg/dL (H)   04/10/2021 218 MG/DL (H)     POC HEMOGLOBIN A1c (%)   Date Value   06/24/2025 8.7 (A)   03/18/2025 9.7 (A)   09/18/2024 8.0 (A)     Hemoglobin A1C (%)   Date Value   09/18/2024 8.3 (H)     Bicarbonate   Date Value   09/18/2024 28 mmol/L   04/21/2023 23 mmol/L   04/10/2021 23 MMOL/L (L)     Urea Nitrogen   Date Value   09/18/2024 7 mg/dL   04/21/2023 10 mg/dL   04/10/2021 10 MG/DL     Creatinine   Date Value   09/18/2024 0.63 mg/dL   04/21/2023 0.51 mg/dL   04/10/2021 0.5 MG/DL     Lab Results   Component Value Date    CHOL 156 09/18/2024    CHOL 183 04/21/2023    CHOL 172 02/08/2019     Lab Results    Component Value Date    HDL 42.4 09/18/2024    HDL 45.4 04/21/2023    HDL 33.5 (A) 02/08/2019     Lab Results   Component Value Date    LDLCALC 73 09/18/2024     Lab Results   Component Value Date    TRIG 201 (H) 09/18/2024    TRIG 128 04/21/2023    TRIG 133 02/08/2019     Lab Results   Component Value Date    TSH 0.63 09/18/2024       IMPRESSION  TYPE 1 DIABETES MELLITUS WITH HYPERGLYCEMIA  INSULIN PUMP STATUS  Rapid A1c 8.7%  A1c high but improving  Current pump requires replacement      RECOMMENDATIONS  Basal total 28.6 units  MN-06 1.1  06-11 1.4 unit/h  11-17 1.1 unit/h  17-21 1.2 unit/h  21-MN 1.2 unit/h    Pursue replacement of pump from [employer]    Follow up 3 months  Draw labs, urine albumin before next appointment           [1] No family history on file.

## 2025-07-06 ASSESSMENT — ENCOUNTER SYMPTOMS
CONSTIPATION: 0
POLYDIPSIA: 0
FREQUENCY: 0
ABDOMINAL PAIN: 0
SORE THROAT: 0
ARTHRALGIAS: 0
NAUSEA: 0
VOMITING: 0
COUGH: 0
HEADACHES: 0
FEVER: 0
SHORTNESS OF BREATH: 0
DIARRHEA: 0
APPETITE CHANGE: 0
NERVOUS/ANXIOUS: 0

## 2025-09-30 ENCOUNTER — APPOINTMENT (OUTPATIENT)
Dept: ENDOCRINOLOGY | Facility: CLINIC | Age: 25
End: 2025-09-30
Payer: MEDICARE